# Patient Record
Sex: FEMALE | Race: BLACK OR AFRICAN AMERICAN | NOT HISPANIC OR LATINO | Employment: FULL TIME | URBAN - METROPOLITAN AREA
[De-identification: names, ages, dates, MRNs, and addresses within clinical notes are randomized per-mention and may not be internally consistent; named-entity substitution may affect disease eponyms.]

---

## 2020-12-10 ENCOUNTER — APPOINTMENT (OUTPATIENT)
Dept: GENERAL RADIOLOGY | Facility: HOSPITAL | Age: 25
End: 2020-12-10

## 2020-12-10 ENCOUNTER — HOSPITAL ENCOUNTER (EMERGENCY)
Facility: HOSPITAL | Age: 25
Discharge: HOME | End: 2020-12-10
Attending: EMERGENCY MEDICINE | Admitting: EMERGENCY MEDICINE

## 2020-12-10 VITALS
TEMPERATURE: 97.9 F | HEIGHT: 63 IN | RESPIRATION RATE: 16 BRPM | OXYGEN SATURATION: 100 % | HEART RATE: 70 BPM | SYSTOLIC BLOOD PRESSURE: 141 MMHG | BODY MASS INDEX: 24.63 KG/M2 | WEIGHT: 139 LBS | DIASTOLIC BLOOD PRESSURE: 87 MMHG

## 2020-12-10 DIAGNOSIS — M54.2 NECK PAIN, ACUTE: ICD-10-CM

## 2020-12-10 DIAGNOSIS — M25.511 ACUTE PAIN OF RIGHT SHOULDER: ICD-10-CM

## 2020-12-10 DIAGNOSIS — M79.10 MUSCULAR PAIN: ICD-10-CM

## 2020-12-10 DIAGNOSIS — Z87.828 HISTORY OF MOTOR VEHICLE ACCIDENT: Primary | ICD-10-CM

## 2020-12-10 PROCEDURE — 72040 X-RAY EXAM NECK SPINE 2-3 VW: CPT

## 2020-12-10 PROCEDURE — 99282 EMERGENCY DEPT VISIT SF MDM: CPT

## 2020-12-10 PROCEDURE — 73030 X-RAY EXAM OF SHOULDER: CPT

## 2020-12-10 NOTE — DISCHARGE INSTRUCTIONS
ER evaluation revealed normal x-rays of the cervical spine and right shoulder.  Suspect musculoskeletal pain.  Recommend patient to establish care with primary care physician for close follow-up.  We will give her a list of accepting physicians in the local area.  We will give sprain/strain instructions.  Rx for diclofenac 50 mg by mouth 3 times a day as needed for pain/inflammation.  Patient may also alternate heat and ice the affected muscle groups.  Return to the ER sooner if any worsening symptoms.    Follow up with one of the [facility] Primary Care Providers below to setup primary care. If you need assistance coordinating a primary care appointment with a [facility] Primary Care Provider, please contact the Primary Care Coordinators at [phone] for appointment scheduling.    [facility], Primary Care   [address], [address]   [place], [place] [address] [phone]    [facility] Internal Medicine & Endocrinology  [address], [address]  [place] [address] [phone]    [facility] Family Medicine  [address], [address]   [place] [address] [phone]    [facility] Primary Care  [address], [address]  [place], [place] [id number] [phone]    [facility], Primary Care,   [address], [address]   [place], [place] [address] [phone]    [facility] Primary Care  [address], [address]  [place], [place] [address] [phone]    [facility], Primary Care  [address], [address]  [place], [place] [address] [phone]    [facility] Internal Medicine & Pediatrics  [address] [place] [address], [address]   [place] [address] [phone]    [facility], Primary Care  [address] [place] [address] [phone]      [facility]  [facility] Primary Care  [address]   [place] [address] [phone]    [facility] Family Medicine  [address] [place] [address] [place] [address] [phone]
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
## 2021-12-03 PROCEDURE — U0004 COV-19 TEST NON-CDC HGH THRU: HCPCS | Performed by: FAMILY MEDICINE

## 2022-06-13 PROCEDURE — U0004 COV-19 TEST NON-CDC HGH THRU: HCPCS | Performed by: NURSE PRACTITIONER

## 2024-03-06 ENCOUNTER — HOSPITAL ENCOUNTER (EMERGENCY)
Facility: HOSPITAL | Age: 29
Discharge: HOME | End: 2024-03-06
Attending: EMERGENCY MEDICINE
Payer: COMMERCIAL

## 2024-03-06 VITALS
TEMPERATURE: 98.1 F | SYSTOLIC BLOOD PRESSURE: 127 MMHG | WEIGHT: 145 LBS | BODY MASS INDEX: 26.68 KG/M2 | HEIGHT: 62 IN | RESPIRATION RATE: 20 BRPM | HEART RATE: 65 BPM | DIASTOLIC BLOOD PRESSURE: 88 MMHG | OXYGEN SATURATION: 99 %

## 2024-03-06 DIAGNOSIS — K08.89 PAIN, DENTAL: Primary | ICD-10-CM

## 2024-03-06 DIAGNOSIS — K02.9 DENTAL CARIES: ICD-10-CM

## 2024-03-06 PROCEDURE — 99283 EMERGENCY DEPT VISIT LOW MDM: CPT

## 2024-03-06 RX ORDER — IBUPROFEN 800 MG/1
800 TABLET ORAL
Qty: 30 TABLET | Refills: 0 | Status: SHIPPED | OUTPATIENT
Start: 2024-03-06

## 2024-03-06 RX ORDER — IBUPROFEN 800 MG/1
800 TABLET ORAL ONCE
Status: COMPLETED | OUTPATIENT
Start: 2024-03-06 | End: 2024-03-06

## 2024-03-06 RX ORDER — ACETAMINOPHEN 500 MG
1000 TABLET ORAL ONCE
Status: COMPLETED | OUTPATIENT
Start: 2024-03-06 | End: 2024-03-06

## 2024-03-06 RX ORDER — AMOXICILLIN 875 MG/1
875 TABLET, COATED ORAL 2 TIMES DAILY
Qty: 14 TABLET | Refills: 0 | Status: SHIPPED | OUTPATIENT
Start: 2024-03-06

## 2024-03-06 RX ORDER — AMOXICILLIN 875 MG/1
875 TABLET, COATED ORAL ONCE
Status: COMPLETED | OUTPATIENT
Start: 2024-03-06 | End: 2024-03-06

## 2024-03-06 RX ORDER — LIDOCAINE HYDROCHLORIDE 20 MG/ML
10 SOLUTION OROPHARYNGEAL ONCE
Status: COMPLETED | OUTPATIENT
Start: 2024-03-06 | End: 2024-03-06

## 2024-03-06 RX ADMIN — LIDOCAINE HYDROCHLORIDE 10 ML: 20 SOLUTION ORAL at 03:56

## 2024-03-06 RX ADMIN — IBUPROFEN 800 MG: 800 TABLET, FILM COATED ORAL at 03:08

## 2024-03-06 RX ADMIN — TOPICAL ANESTHETIC 1 SPRAY: 200 SPRAY DENTAL; PERIODONTAL at 03:56

## 2024-03-06 RX ADMIN — AMOXICILLIN 875 MG: 875 TABLET, FILM COATED ORAL at 03:08

## 2024-03-06 RX ADMIN — ACETAMINOPHEN 1000 MG: 500 TABLET ORAL at 03:56

## 2024-03-06 NOTE — ED PROVIDER NOTES
Subjective   History of Present Illness  This is a healthy 28-year-old female that presents the ER with increased pain to her right upper wisdom tooth.  She denies any dental fracture but does have diffuse cavities.  She says that pain is throbbing and aching and radiates to the right ear.  She reports right facial pain.  There is no facial or jaw swelling.  She denies any smoking history.  She has tried ibuprofen x 2 throughout the last 24 hours.  She denies any personal history of diabetes mellitus or alcohol use.  LMP: Now.  No other concerns at this time.    History provided by:  Patient  Dental Pain  Location:  Upper  Upper teeth location:  1/RU 3rd molar  Quality:  Aching and throbbing  Duration:  3 days  Timing:  Constant  Progression:  Unchanged  Chronicity:  New  Context: dental caries    Context: not dental fracture, normal dentition and not recent dental surgery    Context comment:  Increased pain to tooth #1, right upper wisdom tooth, without any fracture.  Diffuse dental cavities.  Pain is throbbing and aching and radiates to the right ear.  No facial or jaw swelling.  No fever/chills.  Relieved by:  Nothing  Worsened by:  Nothing  Ineffective treatments:  NSAIDs (Ibuprofen 400 mg around 1 AM this morning)  Associated symptoms: facial pain (Right facial pain)    Associated symptoms: no congestion, no difficulty swallowing, no drooling, no facial swelling, no fever, no gum swelling, no headaches, no neck pain, no neck swelling, no oral bleeding and no oral lesions    Risk factors: no alcohol problem, no diabetes and no smoking        Review of Systems   Constitutional: Negative.  Negative for activity change, appetite change, chills, diaphoresis and fever.   HENT:  Positive for dental problem (Dental pain Tooth #1. Right facial/jaw pain) and ear pain (Dental pain radiates to the right ear). Negative for congestion, drooling, facial swelling, mouth sores, postnasal drip, rhinorrhea, sinus pressure, sinus  pain, sneezing, sore throat and trouble swallowing.    Respiratory: Negative.     Gastrointestinal: Negative.  Negative for nausea and vomiting.   Musculoskeletal: Negative.  Negative for neck pain.   Neurological: Negative.  Negative for dizziness and headaches.   All other systems reviewed and are negative.      No past medical history on file.    No Known Allergies    No past surgical history on file.    No family history on file.    Social History     Socioeconomic History    Marital status: Single   Tobacco Use    Smoking status: Never    Smokeless tobacco: Never   Vaping Use    Vaping status: Never Used   Substance and Sexual Activity    Alcohol use: Defer    Drug use: Defer    Sexual activity: Defer           Objective   Physical Exam  Vitals and nursing note reviewed.   Constitutional:       General: She is not in acute distress.     Appearance: She is not ill-appearing, toxic-appearing or diaphoretic.   HENT:      Right Ear: Tympanic membrane normal. No tenderness. Tympanic membrane is not erythematous, retracted or bulging.      Left Ear: Tympanic membrane normal. No tenderness. Tympanic membrane is not erythematous, retracted or bulging.      Ears:      Comments: Bilateral TM's are clear.     Nose: No congestion or rhinorrhea.      Mouth/Throat:      Dentition: Dental tenderness and dental caries present. No gingival swelling, dental abscesses or gum lesions.      Pharynx: No pharyngeal swelling, oropharyngeal exudate, posterior oropharyngeal erythema or uvula swelling.      Comments: Diffuse dental cavities. Point tenderness to Tooth #1. No fracture noted. No adjacent gingival erythema. No facial or jaw swelling. Posterior pharynx is non-erythematous and patent.  Neck:      Comments: No cervical lymphadenopathy.  Cardiovascular:      Rate and Rhythm: No extrasystoles are present.     Comments: RR&R.No ectopy.  Pulmonary:      Comments: Lungs are clear to auscultation bilaterally.  Musculoskeletal:       Right lower leg: No edema.      Left lower leg: No edema.   Lymphadenopathy:      Head:      Right side of head: No submental or submandibular adenopathy.      Left side of head: No submental or submandibular adenopathy.      Cervical: No cervical adenopathy.      Right cervical: No superficial, deep or posterior cervical adenopathy.     Left cervical: No superficial, deep or posterior cervical adenopathy.      Comments: No bilateral submental or submandibular LAD. No cervical LAD.         Procedures           ED Course  ED Course as of 03/06/24 0400   Wed Mar 06, 2024   0353 Patient had cavities with tenderness to tooth #1.  There was no adjacent gingival swelling or abscess.  No facial or jaw swelling and no lymphadenopathy.  Vital signs are stable.  Recommend good dental hygiene with brushing the teeth twice daily and first available recheck with oral surgeon to discuss wisdom teeth extraction.  Rx for amoxicillin 875 mg twice daily x 7 days and we wrote new Rx for ibuprofen 800 mg by mouth 3 times daily with meals as needed pain/inflammation.  We also sent patient home with dental balls and she may use those topically every 2 hours as needed for discomfort.  She also may take Tylenol every 4-6 hours as needed for breakthrough pain.  Return to the ER if worsening symptoms. [FC]      ED Course User Index  [FC] Sydnee He, CAIN                                   No results found for this or any previous visit (from the past 24 hour(s)).  Note: In addition to lab results from this visit, the labs listed above may include labs taken at another facility or during a different encounter within the last 24 hours. Please correlate lab times with ED admission and discharge times for further clarification of the services performed during this visit.    No orders to display     Vitals:    03/06/24 0249   BP: 127/88   BP Location: Left arm   Patient Position: Sitting   Pulse: 69   Resp: 20   Temp: 98.1 °F (36.7 °C)  "  TempSrc: Oral   SpO2: 98%   Weight: 65.8 kg (145 lb)   Height: 157.5 cm (62\")     Medications   benzocaine (HURRICAINE) 20 % liquid solution 1 spray (1 spray Mouth/Throat Given 3/6/24 0356)   amoxicillin (AMOXIL) tablet 875 mg (875 mg Oral Given 3/6/24 0308)   ibuprofen (ADVIL,MOTRIN) tablet 800 mg (800 mg Oral Given 3/6/24 0308)   Lidocaine Viscous HCl (XYLOCAINE) 2 % solution 10 mL (10 mL Mouth/Throat Given 3/6/24 0356)   acetaminophen (TYLENOL) tablet 1,000 mg (1,000 mg Oral Given 3/6/24 0356)     ECG/EMG Results (last 24 hours)       ** No results found for the last 24 hours. **          No orders to display                 Medical Decision Making  Risk  Prescription drug management.        Final diagnoses:   Pain, dental   Dental caries       ED Disposition  ED Disposition       ED Disposition   Discharge    Condition   Stable    Comment   --               Raz Elise, DMD  [address]  [address]  [place] [id number] [phone]    Call today  Call today for close re-check to discuss wisdom teeth extraction    [facility] EMERGENCY DEPARTMENT  [address] [address] [phone]    If symptoms worsen         Medication List        New Prescriptions      amoxicillin 875 MG tablet  Commonly known as: AMOXIL  Take 1 tablet by mouth 2 (Two) Times a Day.     ibuprofen 800 MG tablet  Commonly known as: ADVIL,MOTRIN  Take 1 tablet by mouth 3 (Three) Times a Day With Meals.            Stop      brompheniramine-pseudoephedrine-DM 30-2-10 MG/5ML syrup               Where to Get Your Medications        These medications were sent to [id number] - [place], [place] - [address] AT [facility] [address] [facility] - [phone]  - [phone] FX  [address], [facility] [address]      Phone: [phone]   amoxicillin 875 MG tablet  ibuprofen 800 MG tablet            Sydnee He PA-C  03/06/24 0400    "

## 2024-03-06 NOTE — DISCHARGE INSTRUCTIONS
Patient has localized tenderness to tooth #1, right upper wisdom tooth.  There is no evidence of abscess or facial/jaw swelling.  Rx for amoxicillin 875 mg twice daily x 7 days and Rx for ibuprofen 800 mg by mouth 3 times daily with meals as needed for pain/inflammation.  We also sent patient home with topical tooth balls which she may use every 2 hours as needed for discomfort.  We gave phone number for Dr. Elise, oral surgeon.  Call the office today to set up close follow-up to discuss wisdom teeth extraction.  Return to the ER if any fever or facial/jaw swelling.